# Patient Record
(demographics unavailable — no encounter records)

---

## 2024-11-19 NOTE — ASSESSMENT
[Patient Optimized for Surgery] : Patient optimized for surgery [As per surgery] : as per surgery [No Further Testing Recommended] : no further testing recommended [FreeTextEntry4] : low to intermediate risk procedure pt with good functional status (METS>4) EKG: Sinus rhythm

## 2024-11-19 NOTE — HISTORY OF PRESENT ILLNESS
[No Pertinent Cardiac History] : no history of aortic stenosis, atrial fibrillation, coronary artery disease, recent myocardial infarction, or implantable device/pacemaker [No Pertinent Pulmonary History] : no history of asthma, COPD, sleep apnea, or smoking [No Adverse Anesthesia Reaction] : no adverse anesthesia reaction in self or family member [(Patient denies any chest pain, claudication, dyspnea on exertion, orthopnea, palpitations or syncope)] : Patient denies any chest pain, claudication, dyspnea on exertion, orthopnea, palpitations or syncope [Moderate (4-6 METs)] : Moderate (4-6 METs) [Chronic Anticoagulation] : no chronic anticoagulation [Chronic Kidney Disease] : no chronic kidney disease [Diabetes] : no diabetes [FreeTextEntry1] : bilateral reduction mammoplasty [FreeTextEntry2] : 11/25/24 [FreeTextEntry3] : Dr Christensen [FreeTextEntry4] : Ms. CARRASQUILLO is a39 year F with PMH of HTN who comes for pre op optimization. No complaints today. Pt is able to complete 2 flights of stairs without SOB/CP.